# Patient Record
(demographics unavailable — no encounter records)

---

## 2024-10-14 NOTE — DISCUSSION/SUMMARY
[de-identified] : 27 year old male with 8 months of persistent left matthieu cervical and matthieu-scapular pain. He also occasionally gets chest wall apin.  He has been treated previously with medrol dose pack, Ibuprofen 800 mg and Physical therapy for over 6 weeks with no benefit. He has also tried Chiropractics with no benefit.  AT this point based on exam and x-rays and failure of all non operative management ot dent his pain we need an MRI to rule out cervical stenosis or C4-radiculopathy.   I have prescribed Meloxicam and tramadol PO  He will get the MRI at [facility] and then see our C spine specialist Dr Maldonado Dumas.   '

## 2024-10-14 NOTE — DISCUSSION/SUMMARY
[de-identified] : 27 year old male with 8 months of persistent left matthieu cervical and matthieu-scapular pain. He also occasionally gets chest wall apin.  He has been treated previously with medrol dose pack, Ibuprofen 800 mg and Physical therapy for over 6 weeks with no benefit. He has also tried Chiropractics with no benefit.  AT this point based on exam and x-rays and failure of all non operative management ot dent his pain we need an MRI to rule out cervical stenosis or C4-radiculopathy.   I have prescribed Meloxicam and tramadol PO  He will get the MRI at [facility] and then see our C spine specialist Dr Maldonado Dumas.   '

## 2024-10-14 NOTE — PHYSICAL EXAM
[de-identified] : Left shoulder full AROM Neg Wise Positive Neer ( with pain matthieu scapular not lateral deltoid region ) SS Is and subscap 5/5 biceps 5/5  Left hand neuro exam WNL no sensory loss.   [de-identified] : 4 views of left shoulder are WNL Lateral C spine x-ray shows clear C3,4,5 DDD with significant narrowing and slight lordosis straightening.

## 2024-10-14 NOTE — PHYSICAL EXAM
[de-identified] : Left shoulder full AROM Neg Wise Positive Neer ( with pain matthieu scapular not lateral deltoid region ) SS Is and subscap 5/5 biceps 5/5  Left hand neuro exam WNL no sensory loss.   [de-identified] : 4 views of left shoulder are WNL Lateral C spine x-ray shows clear C3,4,5 DDD with significant narrowing and slight lordosis straightening.

## 2024-10-14 NOTE — HISTORY OF PRESENT ILLNESS
[de-identified] : Gonzalo Green is a 27 year  old  right hand dominant M patient that presents today with left shoulder and neck pain that started 8-10 months ago. pt states that he carries a heavy backpack and that could have caused the pain. pt has done 6 weeks of Physical therapy in June 2024 which didn't help.  There are cracking sounds in the left shoulder and pain affect him sleeping at night.

## 2024-12-05 NOTE — HISTORY OF PRESENT ILLNESS
[de-identified] :  27-year-old male presents as new patient evaluation of left-sided neck pain.  He describes the symptoms beginning atraumatically approximately 1 year ago.  Describes a burning sharp aching stabbing sensation within the left-sided trapezial musculature with occasional radiation to the shoulder and between the shoulder blades.  More recently he has had similar symptoms on the right.  Denies any radiating pain numbness tingling or weakness in the extremities.  He has tried approximately 2 months of physical therapy focused on the left shoulder and scapular relief.  This involved heat cold elective stone exercise.  He has tried oral anti-inflammatory medicine without relief.  Bothers him during the day when sitting as well as when lying flat trying to sleep.  The pain has become somewhat constant feels that it is causing a notable degree of anxiety for which she is also seeking evaluation with a psychiatrist.

## 2024-12-05 NOTE — PHYSICAL EXAM
[UE/LE] : Sensory: Intact in bilateral upper & lower extremities [Normal DTR Reflexes] : DTR reflexes normal [Normal Proprioception] : sensation intact for proprioception [Normal] : Oriented to person, place, and time, insight and judgement were intact and the affect was normal [de-identified] : 4 view cervical spine x-ray PACS 12/5/2024 Maintained disc space heights and alignment evidence of listhesis.  Physiologic range of motion  Cervical spine [facility] November 11, 2024 No evidence of disc herniation foraminal stenosis or nerve root compression

## 2024-12-05 NOTE — DISCUSSION/SUMMARY
[de-identified] : I reviewed the available imaging with the patient.  I do not feel that a cervical radiculopathy or nerve root compression can explain his current symptoms given the available radiology.  His pain does have a character requiring chronic muscular restriction of the cervical stabilizing musculature and/or trapezius.  Inscribe trial for muscle relaxant as well as a cervical focused physical therapy protocol and additionally consideration of trigger point injection for the affected area which have made referrals today.  He will keep his informed of his progress and otherwise follow-up with me on as-needed basis  I have spent greater than 60 minutes preparing to see the patient, collecting relevant history, performing a thorough history and physical examination, counseling the patient regarding my findings ordering the appropriate therapies and tests, communicating with other relevant healthcare professionals, documenting my encounter and coordinating care.

## 2024-12-30 NOTE — DATA REVIEWED
[MRI] : MRI [FreeTextEntry1] : 	 EXAM: MRI CERVICAL SPINE WITHOUT CONTRAST.  HISTORY: Neck pain.  TECHNIQUE: Multiplanar, multi sequential MRI of the cervical spine was obtained on a 1.5T scanner using a standard protocol.  COMPARISON:  None available.  FINDINGS:  Bone: No acute fracture. Vertebral body heights are preserved. No areas of bone destruction or marrow replacement.   Alignment: The expected cervical lordosis is straightened. No listhesis.  Disc spaces, spinal canal, and neural foramina: Partial disc desiccation at C5-6. Evaluation of the individual motion segments demonstrates the following:  --C2-3 level:   No disc herniation, canal narrowing, or foraminal narrowing.  --C3-4 level:  Slight right eccentric annular bulge and mild right foraminal narrowing. No canal or left foraminal narrowing.  --C4-5 level:  No disc herniation, canal narrowing, or foraminal narrowing.  --C5-6 level:  Slight right eccentric annular bulge. Mild right foraminal narrowing. No canal or left foraminal narrowing.  --C6-7 level:  No disc herniation, canal narrowing, or foraminal narrowing.  --C7-T1 level:  No disc herniation, canal narrowing, or foraminal narrowing.  Spinal cord and skull base:  The cervical spinal cord is normal in signal. The cervicomedullary junction is unremarkable.   Paraspinal/Prevertebral soft tissues: Unremarkable.  IMPRESSION: MRI of the cervical spine demonstrates:  1.  Slight right eccentric annular bulges at C3-4 and C5-6, with mild right foraminal narrowing at both levels.  2.  Nonspecific straightening of the expected cervical lordosis. No listhesis.   Thank you for the opportunity to participate in the care of this patient.

## 2024-12-30 NOTE — ASSESSMENT
[FreeTextEntry1] : Assessment: - Summary : Patient presents with chronic neck and shoulder pain likely due to minor cervical spondylosis and poor neck posture. Patient's condition has also negatively affected his mental health and sleep. - Problems : - Chronic Neck and Shoulder Pain  - Minor Cervical Spondylosis  - Differential Diagnosis : - Pinched Nerve  - Muscle Strain  - Fibromyalgia  - Myofascial Pain Syndrome  Plan: - Summary : We will aim for pain relief and improved sleep with medication and physical therapy. Depending on his reaction to the medication, it may be adjusted in the future. The patient is advised to update me on any changes in his condition and to return for a follow-up visit in 6 to 8 weeks. - Plan : - Continue Physical Therapy  - Administered trigger point injections today  - Prescribe muscle relaxer (Methacarbamol)  - Prescribe Disipramine  - Stop Cymbalta due to its side effects  - Patient to continue taking over-the-counter NSAIDs such as Aleve  Jack Elizabeth DO, FAAPMR Attending Physician, Interventional Pain Medicine  Department of Physical Medicine and Rehabilitation [facility] | [place] [address] [address] [place] [phone] Email: [email or web address]  I have spent greater than 60 minutes preparing to see the patient, collecting relevant history, performing a thorough history and physical examination, counseling the patient regarding my findings ordering the appropriate therapies and tests, communicating with other relevant healthcare professionals, documenting my encounter and coordinating care.

## 2024-12-30 NOTE — PHYSICAL EXAM
[FreeTextEntry1] : Gen: A+O x 3 in NAD Psych: Normal mood and affect. Responds appropriately to commands  Eyes: Anicteric. No discharge. EOMI. Resp: Breathing unlabored  CV: Well Perfused Ext: No c/c/e  Skin: No lesions noted    Gait: Non antalgic, normal reciprocating heel to toe, able to stand on toes and heels.  Tandem gait intact  Negative romberg    Stance: No Trendelenburg sign present with single leg stance     Neuro:   Tone: Normal. No clonus.  Sensation: Grossly intact to light touch and pinprick bilateral lower limbs. Proprioception: Intact at big toes bilaterally.  Reflexes: 2+  symmetric knee jerk, medial hamstring, ankle jerk BR and triceps. Plantars downgoing bilaterally.    MMT:  5/5 in b/l lower extremities  and upper extremities     Spine:   Inspection:  No visual or normalities normal lordosis is maintained  Palpation:  no tenderness to palpation along the cervical paraspinal muscular trigger trapezius area skin lesions or occipital area   Cervical  ROM: Flexion, extension, side-bending, rotation, limited in most planes  pain with lateral flexion  pain with oblique extension  pain with lateral rotation       Special Tests:   - Spurling's negative bilaterally  -Axial loading of the cervical spine is negative bilaterally  -Migue negative bilaly   -Addisons negative bilaterally   -Normal dynamic and static balance   -Hoffmans negative bilaterally

## 2024-12-30 NOTE — PROCEDURE
[de-identified] : Trigger point injections were performed today along the splenius capitis semispinalis and cervical paraspinal musculature.  The area was prepped with chlorhexidine solution x 2. Each area was marked prior to preparation and then entered with a 25-gauge 1-1/2 needle. A 5 cc mixture containing 3 cc of lidocaine 1 cc of 0.5% ropivacaine and 1 cc of normal saline was distributed between multiple spots along these muscles accompanied by dry needling and negative aspiration.  Patient with substantial relief, greater than 50%, after the procedure.

## 2024-12-30 NOTE — HISTORY OF PRESENT ILLNESS
[Neck] : neck [9] : a current pain level of 9/10 [7] : a minimum pain level of 7/10 [10] : a maximum pain level of 10/10 [Sharp] : sharp [Dull] : dull [Throbbing] : throbbing [Left] : left [Shoulder] : shoulder [Stable] : are stable [PT] : PT [Chiropractor] : chiropractor [FreeTextEntry1] : Referring Physician: Dr. Layo Dumas  12/30/2024 Mr. MIKEY GREEN is a very pleasant 27-year male who comes in for evaluation of Left cervical Pain that has been ongoing for 1 year without any specific injury or inciting event. Patient has tried Physical Therapy, Chiropractic, Duloxetine which did help relief temporarily. The pain is located primarily Left cervical Pain radiating to the Left shoulder and the trapezius intermittent and described as sharp, dull, throbbing. The pain is rated as 9/10 and ranges from 7-10/10. The patient's symptoms are aggravated by movement of the arm and alleviated by nothing. The patient works as a  which consists of the use of computer. The patient denies any night pain, numbness/tingling, weakness, or bowel/bladder dysfunction. The patient has no other complaints at this time.   ------------------  Subjective: - Summary : Mr. Green reports chronic pain in the neck and shoulder area which started approximately a year ago. The pain is constant and runs down from his shoulder blade to his neck, predominantly affecting the left side. His pain has also been accompanied by mental distress, for which he is currently seeing a psychiatrist and a therapist. - Chief Complaint (CC) : Persistent neck and shoulder pain - History of Present Illness (HPI) : The onset of the patient's pain started about a year ago. Initial attempts to manage the pain, including chiropractic visits and acupuncture, were not effective. Around five months ago, he began actively seeking medical treatment, visiting a few doctors and undergoing physical therapy, but the pain persisted. He visited Dr. Thomas who suggested that the pain may be due to a spinal issue. This was confirmed by Dr. Dumas who, while ruling out herniation, indicated that the patient's neck posture was unusually straight. - Past Medical History : Not mentioned during the visit - Past Surgical History : No past surgical history mentioned during the visit - Family History : Not mentioned during the visit - Social History : Mr. Green lives in the [place], [place]. He works as a  for [employer]. - Review of Systems : The patient has experienced significant distress due to his unrelenting pain. Adverse impacts on sleep and mental health have been noted. - Medications : He has been prescribed an antidepressant, however, it causes him significant nausea. - Allergies : Not mentioned during the visit [FreeTextEntry3] : Movement of the arm

## 2025-02-10 NOTE — PHYSICAL EXAM
[FreeTextEntry1] : Gen: A+O x 3 in NAD Psych: Normal mood and affect. Responds appropriately to commands Eyes: Anicteric. No discharge. EOMI. Resp: Breathing unlabored CV: Well Perfused Ext: No c/c/e Skin: No lesions noted Gait: Non antalgic, normal reciprocating heel to toe, able to stand on toes and heels. Tandem gait intact Negative romberg Stance: No Trendelenburg sign present with single leg stance   Neuro:  Tone: Normal. No clonus. Sensation: Grossly intact to light touch and pinprick bilateral lower limbs. Proprioception: Intact at big toes bilaterally. Reflexes: 2+ symmetric knee jerk, medial hamstring, ankle jerk BR and triceps. Plantars downgoing bilaterally.  MMT:  5/5 in b/l lower extremities and upper extremities   Spine:  Inspection: No visual or normalities normal lordosis is maintained  Palpation: no tenderness to palpation along the cervical paraspinal muscular trigger trapezius area skin lesions or occipital area  Cervical ROM: Flexion, extension, side-bending, rotation, limited in most planes pain with lateral flexion pain with oblique extension pain with lateral rotation   Special Tests:  - Spurling's negative bilaterally  -Axial loading of the cervical spine is negative bilaterally  -Migue negative bilaly  -Addisons negative bilaterally  -Normal dynamic and static balance  -Hoffmans negative bilaterally.

## 2025-02-10 NOTE — DATA REVIEWED
[MRI] : MRI [FreeTextEntry1] : Tests Reviewed for Present Problem: MRI EXAM: MRI CERVICAL SPINE WITHOUT CONTRAST.  HISTORY: Neck pain.  TECHNIQUE: Multiplanar, multi sequential MRI of the cervical spine was obtained on a 1.5T scanner using a standard protocol.  COMPARISON: None available.  FINDINGS:  Bone: No acute fracture. Vertebral body heights are preserved. No areas of bone destruction or marrow replacement.  Alignment: The expected cervical lordosis is straightened. No listhesis.  Disc spaces, spinal canal, and neural foramina: Partial disc desiccation at C5-6. Evaluation of the individual motion segments demonstrates the following:  --C2-3 level: No disc herniation, canal narrowing, or foraminal narrowing.  --C3-4 level: Slight right eccentric annular bulge and mild right foraminal narrowing. No canal or left foraminal narrowing.  --C4-5 level: No disc herniation, canal narrowing, or foraminal narrowing.  --C5-6 level: Slight right eccentric annular bulge. Mild right foraminal narrowing. No canal or left foraminal narrowing.  --C6-7 level: No disc herniation, canal narrowing, or foraminal narrowing.  --C7-T1 level: No disc herniation, canal narrowing, or foraminal narrowing.  Spinal cord and skull base: The cervical spinal cord is normal in signal. The cervicomedullary junction is unremarkable.  Paraspinal/Prevertebral soft tissues: Unremarkable.  IMPRESSION: MRI of the cervical spine demonstrates:  1. Slight right eccentric annular bulges at C3-4 and C5-6, with mild right foraminal narrowing at both levels. 2. Nonspecific straightening of the expected cervical lordosis. No listhesis.  Thank you for the opportunity to participate in the care of this patient.

## 2025-02-10 NOTE — ASSESSMENT
[FreeTextEntry1] : Most likely cervical myofascial pain with spasm improved with trigger point injection we will increase desipramine to 20 at bedtime continue methocarbamol continue physical therapy follow-up in 2 to 3 weeks repeat trigger point if needed at that time.  Jack Elizabeth DO, FAAPMR Attending Physician, Interventional Pain Medicine  Department of Physical Medicine and Rehabilitation [place] [address] [place] [address] [address] [place] [address] Email: [email or web address]  I have spent greater than 30 minutes preparing to see the patient, collecting relevant history, performing a thorough history and physical examination, counseling the patient regarding my findings ordering the appropriate therapies and tests, communicating with other relevant healthcare professionals, documenting my encounter and coordinating care.

## 2025-02-10 NOTE — HISTORY OF PRESENT ILLNESS
Detail Level: Detailed General Sunscreen Counseling: I recommended a broad spectrum sunscreen with a SPF of 30 or higher.  I explained that SPF 30 sunscreens block approximately 97 percent of the sun's harmful rays.  Sunscreens should be applied at least 15 minutes prior to expected sun exposure and then every 2 hours after that as long as sun exposure continues. If swimming or exercising sunscreen should be reapplied every 45 minutes to an hour after getting wet or sweating.  One ounce, or the equivalent of a shot glass full of sunscreen, is adequate to protect the skin not covered by a bathing suit. I also recommended a lip balm with a sunscreen as well. Sun protective clothing can be used in lieu of sunscreen but must be worn the entire time you are exposed to the sun's rays. [Neck] : neck [5] : a current pain level of 5/10 [3] : a minimum pain level of 3/10 [9] : a maximum pain level of 9/10 [FreeTextEntry1] : Patient presents today for follow-up he did very well with trigger point injections he is endorsing about 50% improvement in his pain continues to have nagging muscular pain in the left trap he is doing well with desipramine no side effects or sequelae feels that the medication is helping has completed 5 weeks of physical therapy thus far.

## 2025-03-04 NOTE — HISTORY OF PRESENT ILLNESS
[Neck] : neck [4] : a current pain level of 4/10 [2] : a minimum pain level of 2/10 [9] : a maximum pain level of 9/10 [FreeTextEntry1] : - Summary : Patient is a 26-year-old male with a history of cervical pain, who has been undergoing conservative treatment including medication, physical therapy, and trigger point injections. He reports improvement in pain frequency but not intensity. - Chief Complaint (CC) : Follow-up for cervical pain - History of Present Illness (HPI) : Patient reports that the constant neck pain has decreased in frequency after using an ergonomic pillow. The pain is still present but less constant. Pain level remains at 6-7 out of 10 when present. Physical therapy has been helpful in improving posture awareness. Patient has been taking prescribed medications, including an antidepressant which has helped reduce anxiety levels from 9 to 2-3 out of 10. He has recently stopped taking muscle relaxants to see if his body can manage without them.

## 2025-03-04 NOTE — DATA REVIEWED
[MRI] : MRI [FreeTextEntry1] :   Tests Reviewed for Present Problem: MRI Tests Reviewed for Present Problem: MRI EXAM: MRI CERVICAL SPINE WITHOUT CONTRAST.  HISTORY: Neck pain.  TECHNIQUE: Multiplanar, multi sequential MRI of the cervical spine was obtained on a 1.5T scanner using a standard protocol.  COMPARISON: None available.  FINDINGS:  Bone: No acute fracture. Vertebral body heights are preserved. No areas of bone destruction or marrow replacement.  Alignment: The expected cervical lordosis is straightened. No listhesis.  Disc spaces, spinal canal, and neural foramina: Partial disc desiccation at C5-6. Evaluation of the individual motion segments demonstrates the following:  --C2-3 level: No disc herniation, canal narrowing, or foraminal narrowing.  --C3-4 level: Slight right eccentric annular bulge and mild right foraminal narrowing. No canal or left foraminal narrowing.  --C4-5 level: No disc herniation, canal narrowing, or foraminal narrowing.  --C5-6 level: Slight right eccentric annular bulge. Mild right foraminal narrowing. No canal or left foraminal narrowing.  --C6-7 level: No disc herniation, canal narrowing, or foraminal narrowing.  --C7-T1 level: No disc herniation, canal narrowing, or foraminal narrowing.  Spinal cord and skull base: The cervical spinal cord is normal in signal. The cervicomedullary junction is unremarkable.  Paraspinal/Prevertebral soft tissues: Unremarkable.  IMPRESSION: MRI of the cervical spine demonstrates:  1. Slight right eccentric annular bulges at C3-4 and C5-6, with mild right foraminal narrowing at both levels. 2. Nonspecific straightening of the expected cervical lordosis. No listhesis.  Thank you for the opportunity to participate in the care of this patient.

## 2025-03-04 NOTE — ASSESSMENT
[FreeTextEntry1] : Most likely cervical myofascial pain with spasm improved with trigger point injection we will increase desipramine to 20 at bedtime continue methocarbamol continue physical therapy follow-up in 2 to 3 months repeat trigger point if needed at that time.  Jack Elizabeth DO, FAAPMR Attending Physician, Interventional Pain Medicine  Department of Physical Medicine and Rehabilitation [place] [address] [place] [address] [address] [place] [address] Email: [email or web address]  I have spent greater than 30 minutes preparing to see the patient, collecting relevant history, performing a thorough history and physical examination, counseling the patient regarding my findings ordering the appropriate therapies and tests, communicating with other relevant healthcare professionals, documenting my encounter and coordinating care.
